# Patient Record
Sex: MALE | Race: OTHER | URBAN - METROPOLITAN AREA
[De-identification: names, ages, dates, MRNs, and addresses within clinical notes are randomized per-mention and may not be internally consistent; named-entity substitution may affect disease eponyms.]

---

## 2018-04-24 ENCOUNTER — EMERGENCY (EMERGENCY)
Facility: HOSPITAL | Age: 53
LOS: 1 days | Discharge: HOME | End: 2018-04-24
Attending: EMERGENCY MEDICINE | Admitting: EMERGENCY MEDICINE
Payer: COMMERCIAL

## 2018-04-24 VITALS
RESPIRATION RATE: 18 BRPM | HEART RATE: 90 BPM | SYSTOLIC BLOOD PRESSURE: 107 MMHG | OXYGEN SATURATION: 100 % | DIASTOLIC BLOOD PRESSURE: 77 MMHG | TEMPERATURE: 99 F

## 2018-04-24 PROCEDURE — 99284 EMERGENCY DEPT VISIT MOD MDM: CPT

## 2018-04-24 PROCEDURE — 93971 EXTREMITY STUDY: CPT | Mod: 26,RT

## 2018-04-24 RX ORDER — HYDROXYZINE HCL 10 MG
1 TABLET ORAL
Qty: 14 | Refills: 0 | OUTPATIENT
Start: 2018-04-24 | End: 2018-04-30

## 2018-04-24 RX ORDER — DIPHENHYDRAMINE HCL 50 MG
50 CAPSULE ORAL ONCE
Qty: 0 | Refills: 0 | Status: COMPLETED | OUTPATIENT
Start: 2018-04-24 | End: 2018-04-24

## 2018-04-24 RX ADMIN — Medication 50 MILLIGRAM(S): at 18:44

## 2018-04-24 RX ADMIN — Medication 60 MILLIGRAM(S): at 18:44

## 2018-04-24 NOTE — ED PROVIDER NOTE - OBJECTIVE STATEMENT
Rash on both legs x 1 week.  Started after doing some gardening.  Itching, R thigh initially then progressed to L thigh.  Itching has subsided but now associated with swelling and hand involvement.  Denies fever or chills.  No CP or SOB.  Denies medication or environmental allergies.  Denies sick contacts.  Seen in his clinic today and told to come for an ultrasound of his leg.  Denies travel history, recent surgery or prolonged immobilization.

## 2018-04-24 NOTE — ED PROVIDER NOTE - SKIN, MLM
erythematous rash B/L R>L.  Erythematous base, some areas raised. Edema present + 1 R side, trace on the L.  NO calf tenderness.  No involvement of the palms and soles.  No mucous membrane involvement.  No skin sloughing, skin desquamation or blistering.

## 2018-04-24 NOTE — ED PROVIDER NOTE - PROGRESS NOTE DETAILS
no DVT.  Remains comfortable appearing, talking on his cell phone.  Short course of prednisone + hydroxyzine.  F.u with his PMD and dermatologist.  Again denies any new soaps, detergents, perfumes.  Likely contact dermatitis from recent gardening work.

## 2018-04-24 NOTE — ED PROVIDER NOTE - MEDICAL DECISION MAKING DETAILS
rash on bilateral thighs and lower legs R>L.  No vesicles, skin sloughing, mucous membrane involvement.  Denies prescribed or OTC medication.  NO fever, joint pain, travel history.  Likely contact dermatitis from recent gardening.  Denies new soaps, detergents.  Prednisone, benadryl, US ordered.

## 2018-04-28 DIAGNOSIS — R21 RASH AND OTHER NONSPECIFIC SKIN ERUPTION: ICD-10-CM

## 2020-07-28 NOTE — ED ADULT NURSE NOTE - NS ED NOTE ABUSE RESPONSE YN
TOMA seen in EP clinic today.  /86, with retake 148/70.  Not concerned about elevated blood pressures as patient is in a great deal of pain today.   Yes

## 2023-01-23 NOTE — ED ADULT NURSE NOTE - NS ED NURSE DC TEACHING
[facility] Care Coordination Contact      [facility] Six-Month Telephone Assessment    6 month telephone assessment completed on 01/23/2023.    ER visits: No  Hospitalizations: No  TCU stays: No  Significant health status changes: Vision and trigger finger on right hand.  Falls/Injuries: No  ADL/IADL changes: Yes: Member states he has right handed middle trigger finger and is having more difficulty completing ADLS. Member is going to hand therapy today and will keep CC updated on the progress. CC informed member and daughter that if there is a major decline then may be able to reassess.  Changes in services: No    Caregiver Assessment follow up:    Eye sight is getting worse and the right arm is worse. He also states the right middle finger has trigger finger. Member will be seeing hand therapy today. Member and daughter to keep CC updated on progress.  Member states the last eye visit was last year. No concerns for falls regarding the vision but has an appointment in May to be re-evaluated.    Goals: See POC in chart for goal progress documentation.        Will see member in 6 months for an annual health risk assessment.   Encouraged member to call CC with any questions or concerns in the meantime.     Fiorella Whitehead RN  [facility]  [phone]         Wound Care